# Patient Record
Sex: MALE | Race: ASIAN | NOT HISPANIC OR LATINO | URBAN - METROPOLITAN AREA
[De-identification: names, ages, dates, MRNs, and addresses within clinical notes are randomized per-mention and may not be internally consistent; named-entity substitution may affect disease eponyms.]

---

## 2020-01-24 ENCOUNTER — OFFICE (INPATIENT)
Dept: URBAN - METROPOLITAN AREA CLINIC 78 | Facility: CLINIC | Age: 65
End: 2020-01-24

## 2020-01-24 VITALS
HEART RATE: 79 BPM | DIASTOLIC BLOOD PRESSURE: 102 MMHG | HEIGHT: 69 IN | SYSTOLIC BLOOD PRESSURE: 155 MMHG | TEMPERATURE: 90.1 F | WEIGHT: 159 LBS | DIASTOLIC BLOOD PRESSURE: 103 MMHG | SYSTOLIC BLOOD PRESSURE: 152 MMHG

## 2020-01-24 DIAGNOSIS — K21.9 GASTRO-ESOPHAGEAL REFLUX DISEASE WITHOUT ESOPHAGITIS: ICD-10-CM

## 2020-01-24 DIAGNOSIS — R10.11 RIGHT UPPER QUADRANT PAIN: ICD-10-CM

## 2020-01-24 DIAGNOSIS — R10.13 EPIGASTRIC PAIN: ICD-10-CM

## 2020-01-24 PROCEDURE — 99244 OFF/OP CNSLTJ NEW/EST MOD 40: CPT

## 2020-01-24 RX ORDER — PANTOPRAZOLE SODIUM 40 MG/1
TABLET, DELAYED RELEASE ORAL
Qty: 60 | Refills: 3 | Status: ACTIVE
Start: 2020-01-24

## 2020-01-24 RX ORDER — SUCRALFATE 1 G/10ML
3000 SUSPENSION ORAL
Qty: 1000 | Refills: 3 | Status: COMPLETED
Start: 2020-01-24 | End: 2020-02-20

## 2020-01-24 NOTE — SERVICEHPINOTES
RACQUEL FORTUNE   is a   64   male who presents with stomach pain. Visited ER on 01/20/20 with RUQ pain. CT of abdomen and pelvis showed small sludge or calculi in the gallbladder, which is a change from previous u/s. No signs of acute cholecystitis. AST 28, ALT 39, ALP 81, total bili 1.60 and lipase 49.  Pt saw PCP yesterday and advised to see GI asap. The pain is at epigastric and right sided abdomen. Has been experiencing this pain over 4-5 days. + Nausea without vomiting. + Acid reflux, more severe since yesterday. BRDenies dysphagia. The pain is worse after eating but still has the pain even without eating. The pain radiates to the back and shoulder. BRMoves bowel daily. Denies blood in stools, melena or diarrhea. Lost about 4-5 lbs since not eating as much. Today, did not eat anything.  Tried Prilosec and prevacid which did not work. BRNever had an EGD in the past or colonoscopy in the past. BRDenies chest pain or sob with exertion. other had a hx of colon cancer in 60's. BRHas been taking aspirin 81 mg since year and a half ago. BRDenies taking NSIDAs.

## 2020-01-26 ENCOUNTER — ON CAMPUS - OUTPATIENT (INPATIENT)
Dept: URBAN - METROPOLITAN AREA HOSPITAL 60 | Facility: HOSPITAL | Age: 65
End: 2020-01-26
Payer: COMMERCIAL

## 2020-01-26 ENCOUNTER — ON CAMPUS - OUTPATIENT (INPATIENT)
Dept: URBAN - METROPOLITAN AREA HOSPITAL 63 | Facility: HOSPITAL | Age: 65
End: 2020-01-26
Payer: COMMERCIAL

## 2020-01-26 DIAGNOSIS — K21.0 GASTRO-ESOPHAGEAL REFLUX DISEASE WITH ESOPHAGITIS: ICD-10-CM

## 2020-01-26 DIAGNOSIS — R10.13 EPIGASTRIC PAIN: ICD-10-CM

## 2020-01-26 PROCEDURE — 99234 HOSP IP/OBS SM DT SF/LOW 45: CPT

## 2020-01-26 PROCEDURE — 99254 IP/OBS CNSLTJ NEW/EST MOD 60: CPT

## 2020-01-29 ENCOUNTER — OFFICE (INPATIENT)
Dept: URBAN - METROPOLITAN AREA CLINIC 32 | Facility: CLINIC | Age: 65
End: 2020-01-29

## 2020-01-29 VITALS
HEART RATE: 75 BPM | DIASTOLIC BLOOD PRESSURE: 81 MMHG | HEART RATE: 86 BPM | HEART RATE: 73 BPM | TEMPERATURE: 98.1 F | RESPIRATION RATE: 16 BRPM | SYSTOLIC BLOOD PRESSURE: 152 MMHG | DIASTOLIC BLOOD PRESSURE: 62 MMHG | DIASTOLIC BLOOD PRESSURE: 94 MMHG | OXYGEN SATURATION: 100 % | SYSTOLIC BLOOD PRESSURE: 163 MMHG | DIASTOLIC BLOOD PRESSURE: 77 MMHG | WEIGHT: 159 LBS | SYSTOLIC BLOOD PRESSURE: 106 MMHG | HEART RATE: 80 BPM | HEIGHT: 69 IN | DIASTOLIC BLOOD PRESSURE: 65 MMHG | HEART RATE: 72 BPM | SYSTOLIC BLOOD PRESSURE: 117 MMHG | SYSTOLIC BLOOD PRESSURE: 96 MMHG | OXYGEN SATURATION: 98 % | RESPIRATION RATE: 11 BRPM | RESPIRATION RATE: 18 BRPM | SYSTOLIC BLOOD PRESSURE: 99 MMHG

## 2020-01-29 DIAGNOSIS — K20.8 OTHER ESOPHAGITIS: ICD-10-CM

## 2020-01-29 DIAGNOSIS — R10.13 EPIGASTRIC PAIN: ICD-10-CM

## 2020-01-29 DIAGNOSIS — K29.60 OTHER GASTRITIS WITHOUT BLEEDING: ICD-10-CM

## 2020-01-29 DIAGNOSIS — K21.9 GASTRO-ESOPHAGEAL REFLUX DISEASE WITHOUT ESOPHAGITIS: ICD-10-CM

## 2020-01-29 DIAGNOSIS — B96.81 HELICOBACTER PYLORI [H. PYLORI] AS THE CAUSE OF DISEASES CLA: ICD-10-CM

## 2020-01-29 LAB
GI UPPER EGD HISOLOGY - SPECM 1 JAR(S): 3: (no result)
GI UPPER EGD HISOLOGY - SPECM 1 JAR(S): 3: PDF REPORT: (no result)

## 2020-01-29 PROCEDURE — 00002: CPT

## 2020-01-29 PROCEDURE — 43239 EGD BIOPSY SINGLE/MULTIPLE: CPT

## 2020-02-04 ENCOUNTER — INPATIENT HOSPITAL (INPATIENT)
Dept: URBAN - METROPOLITAN AREA HOSPITAL 60 | Facility: HOSPITAL | Age: 65
End: 2020-02-04
Payer: COMMERCIAL

## 2020-02-04 DIAGNOSIS — E87.1 HYPO-OSMOLALITY AND HYPONATREMIA: ICD-10-CM

## 2020-02-04 DIAGNOSIS — R10.11 RIGHT UPPER QUADRANT PAIN: ICD-10-CM

## 2020-02-04 DIAGNOSIS — K21.0 GASTRO-ESOPHAGEAL REFLUX DISEASE WITH ESOPHAGITIS: ICD-10-CM

## 2020-02-04 DIAGNOSIS — R10.13 EPIGASTRIC PAIN: ICD-10-CM

## 2020-02-04 PROCEDURE — 99254 IP/OBS CNSLTJ NEW/EST MOD 60: CPT

## 2020-02-05 ENCOUNTER — INPATIENT HOSPITAL (INPATIENT)
Dept: URBAN - METROPOLITAN AREA HOSPITAL 60 | Facility: HOSPITAL | Age: 65
End: 2020-02-05
Payer: COMMERCIAL

## 2020-02-05 DIAGNOSIS — B96.81 HELICOBACTER PYLORI [H. PYLORI] AS THE CAUSE OF DISEASES CLA: ICD-10-CM

## 2020-02-05 DIAGNOSIS — K29.60 OTHER GASTRITIS WITHOUT BLEEDING: ICD-10-CM

## 2020-02-05 DIAGNOSIS — K21.0 GASTRO-ESOPHAGEAL REFLUX DISEASE WITH ESOPHAGITIS: ICD-10-CM

## 2020-02-05 DIAGNOSIS — E87.1 HYPO-OSMOLALITY AND HYPONATREMIA: ICD-10-CM

## 2020-02-05 DIAGNOSIS — R10.11 RIGHT UPPER QUADRANT PAIN: ICD-10-CM

## 2020-02-05 DIAGNOSIS — R10.13 EPIGASTRIC PAIN: ICD-10-CM

## 2020-02-05 PROCEDURE — 99232 SBSQ HOSP IP/OBS MODERATE 35: CPT

## 2020-02-20 ENCOUNTER — OFFICE (INPATIENT)
Dept: URBAN - METROPOLITAN AREA CLINIC 33 | Facility: CLINIC | Age: 65
End: 2020-02-20
Payer: COMMERCIAL

## 2020-02-20 VITALS
HEIGHT: 69 IN | TEMPERATURE: 97.3 F | SYSTOLIC BLOOD PRESSURE: 150 MMHG | DIASTOLIC BLOOD PRESSURE: 79 MMHG | WEIGHT: 163.2 LBS | HEART RATE: 62 BPM

## 2020-02-20 DIAGNOSIS — B96.81 HELICOBACTER PYLORI [H. PYLORI] AS THE CAUSE OF DISEASES CLA: ICD-10-CM

## 2020-02-20 DIAGNOSIS — K21.9 GASTRO-ESOPHAGEAL REFLUX DISEASE WITHOUT ESOPHAGITIS: ICD-10-CM

## 2020-02-20 DIAGNOSIS — R10.13 EPIGASTRIC PAIN: ICD-10-CM

## 2020-02-20 DIAGNOSIS — R10.11 RIGHT UPPER QUADRANT PAIN: ICD-10-CM

## 2020-02-20 PROCEDURE — 99214 OFFICE O/P EST MOD 30 MIN: CPT

## 2020-07-13 LAB
H PYLORI BREATH TEST: POSITIVE
H. PYLORI BREATH COLLECTION: (no result)

## 2020-07-29 PROBLEM — K20.9 ESOPHAGITIS, UNSPECIFIED: Status: ACTIVE | Noted: 2020-01-29

## 2020-11-13 ENCOUNTER — OFFICE (INPATIENT)
Dept: URBAN - METROPOLITAN AREA CLINIC 78 | Facility: CLINIC | Age: 65
End: 2020-11-13

## 2020-11-13 PROCEDURE — 00014: CPT

## 2022-08-18 ENCOUNTER — NEW PATIENT (OUTPATIENT)
Dept: URBAN - METROPOLITAN AREA CLINIC 80 | Facility: CLINIC | Age: 67
End: 2022-08-18

## 2022-08-18 DIAGNOSIS — H35.363: ICD-10-CM

## 2022-08-18 DIAGNOSIS — H25.13: ICD-10-CM

## 2022-08-18 DIAGNOSIS — E11.3293: ICD-10-CM

## 2022-08-18 PROCEDURE — 92134 CPTRZ OPH DX IMG PST SGM RTA: CPT

## 2022-08-18 PROCEDURE — 92004 COMPRE OPH EXAM NEW PT 1/>: CPT

## 2022-08-18 PROCEDURE — 92202 OPSCPY EXTND ON/MAC DRAW: CPT

## 2022-08-18 ASSESSMENT — VISUAL ACUITY
OD_SC: 20/60-2
OS_SC: 20/30-2
OD_PH: 20/30+2

## 2022-08-18 ASSESSMENT — TONOMETRY
OD_IOP_MMHG: 12
OS_IOP_MMHG: 14

## 2023-09-21 ENCOUNTER — FOLLOW UP (OUTPATIENT)
Dept: URBAN - METROPOLITAN AREA CLINIC 80 | Facility: CLINIC | Age: 68
End: 2023-09-21

## 2023-09-21 DIAGNOSIS — H35.363: ICD-10-CM

## 2023-09-21 DIAGNOSIS — H25.13: ICD-10-CM

## 2023-09-21 DIAGNOSIS — E11.3293: ICD-10-CM

## 2023-09-21 PROCEDURE — 92134 CPTRZ OPH DX IMG PST SGM RTA: CPT

## 2023-09-21 PROCEDURE — 92202 OPSCPY EXTND ON/MAC DRAW: CPT

## 2023-09-21 PROCEDURE — 92014 COMPRE OPH EXAM EST PT 1/>: CPT

## 2023-09-21 ASSESSMENT — TONOMETRY
OS_IOP_MMHG: 14
OD_IOP_MMHG: 14

## 2023-09-21 ASSESSMENT — VISUAL ACUITY
OD_PH: 20/30+2
OS_SC: 20/40
OD_SC: 20/60+2
OS_PH: 20/20